# Patient Record
Sex: MALE | Race: BLACK OR AFRICAN AMERICAN | NOT HISPANIC OR LATINO | Employment: FULL TIME | URBAN - METROPOLITAN AREA
[De-identification: names, ages, dates, MRNs, and addresses within clinical notes are randomized per-mention and may not be internally consistent; named-entity substitution may affect disease eponyms.]

---

## 2022-10-07 ENCOUNTER — TELEPHONE (OUTPATIENT)
Dept: GASTROENTEROLOGY | Facility: CLINIC | Age: 57
End: 2022-10-07

## 2022-10-07 NOTE — TELEPHONE ENCOUNTER
----- Message from Kristal Hutchins sent at 9/19/2022 11:53 AM CDT -----  Regarding: appt access  Contact: Pt  Pt is calling to schedule colonoscopy with Dr Estrella. Pt has [employer] ins but did not have card available. Please call back at [phone]//thank you acc

## 2022-10-07 NOTE — TELEPHONE ENCOUNTER
Reached out to pt to schedule 5 yr repeat colonoscopy per [facility]. Pt states he just [facility] and will drop off his report to our office. - VL